# Patient Record
Sex: MALE | Race: WHITE | NOT HISPANIC OR LATINO | Employment: FULL TIME | URBAN - METROPOLITAN AREA
[De-identification: names, ages, dates, MRNs, and addresses within clinical notes are randomized per-mention and may not be internally consistent; named-entity substitution may affect disease eponyms.]

---

## 2017-09-13 ENCOUNTER — HOSPITAL ENCOUNTER (EMERGENCY)
Facility: HOSPITAL | Age: 42
Discharge: HOME | End: 2017-09-14
Attending: EMERGENCY MEDICINE | Admitting: EMERGENCY MEDICINE

## 2017-09-13 DIAGNOSIS — K70.9 ALCOHOLIC LIVER DISEASE (HCC): Primary | ICD-10-CM

## 2017-09-13 DIAGNOSIS — R60.0 PEDAL EDEMA: ICD-10-CM

## 2017-09-13 PROCEDURE — 85007 BL SMEAR W/DIFF WBC COUNT: CPT | Performed by: PHYSICIAN ASSISTANT

## 2017-09-13 PROCEDURE — 85025 COMPLETE CBC W/AUTO DIFF WBC: CPT | Performed by: PHYSICIAN ASSISTANT

## 2017-09-13 PROCEDURE — 99284 EMERGENCY DEPT VISIT MOD MDM: CPT

## 2017-09-13 PROCEDURE — 81001 URINALYSIS AUTO W/SCOPE: CPT | Performed by: PHYSICIAN ASSISTANT

## 2017-09-13 PROCEDURE — 80053 COMPREHEN METABOLIC PANEL: CPT | Performed by: PHYSICIAN ASSISTANT

## 2017-09-13 RX ORDER — PREDNISONE 5 MG/ML
SOLUTION ORAL DAILY
COMMUNITY

## 2017-09-14 VITALS
SYSTOLIC BLOOD PRESSURE: 150 MMHG | BODY MASS INDEX: 52.48 KG/M2 | RESPIRATION RATE: 18 BRPM | WEIGHT: 315 LBS | DIASTOLIC BLOOD PRESSURE: 80 MMHG | OXYGEN SATURATION: 98 % | HEIGHT: 65 IN | HEART RATE: 90 BPM | TEMPERATURE: 98.9 F

## 2017-09-14 LAB
ALBUMIN SERPL-MCNC: 2.9 G/DL (ref 3.5–5.2)
ALBUMIN/GLOB SERPL: 0.9 G/DL
ALP SERPL-CCNC: 179 U/L (ref 39–117)
ALT SERPL W P-5'-P-CCNC: 153 U/L (ref 1–41)
ANION GAP SERPL CALCULATED.3IONS-SCNC: 12.2 MMOL/L
ANISOCYTOSIS BLD QL: NORMAL
AST SERPL-CCNC: 194 U/L (ref 1–40)
BACTERIA UR QL AUTO: ABNORMAL /HPF
BASOPHILS # BLD AUTO: 0 10*3/MM3 (ref 0–0.2)
BASOPHILS NFR BLD AUTO: 0 % (ref 0–1.5)
BILIRUB SERPL-MCNC: 9.3 MG/DL (ref 0.1–1.2)
BILIRUB UR QL STRIP: ABNORMAL
BUN BLD-MCNC: 20 MG/DL (ref 6–20)
BUN/CREAT SERPL: 16.8 (ref 7–25)
CALCIUM SPEC-SCNC: 8.5 MG/DL (ref 8.6–10.5)
CHLORIDE SERPL-SCNC: 98 MMOL/L (ref 98–107)
CLARITY UR: CLEAR
CO2 SERPL-SCNC: 24.8 MMOL/L (ref 22–29)
COLOR UR: ABNORMAL
CREAT BLD-MCNC: 1.19 MG/DL (ref 0.76–1.27)
DEPRECATED RDW RBC AUTO: 76.6 FL (ref 37–54)
EOSINOPHIL # BLD AUTO: 0.04 10*3/MM3 (ref 0–0.7)
EOSINOPHIL NFR BLD AUTO: 0.2 % (ref 0.3–6.2)
ERYTHROCYTE [DISTWIDTH] IN BLOOD BY AUTOMATED COUNT: 19.5 % (ref 11.5–14.5)
GFR SERPL CREATININE-BSD FRML MDRD: 68 ML/MIN/1.73
GLOBULIN UR ELPH-MCNC: 3.4 GM/DL
GLUCOSE BLD-MCNC: 96 MG/DL (ref 65–99)
GLUCOSE UR STRIP-MCNC: NEGATIVE MG/DL
HCT VFR BLD AUTO: 29.3 % (ref 40.4–52.2)
HGB BLD-MCNC: 10.5 G/DL (ref 13.7–17.6)
HGB UR QL STRIP.AUTO: NEGATIVE
HYALINE CASTS UR QL AUTO: ABNORMAL /LPF
IMM GRANULOCYTES # BLD: 0.12 10*3/MM3 (ref 0–0.03)
IMM GRANULOCYTES NFR BLD: 0.7 % (ref 0–0.5)
KETONES UR QL STRIP: NEGATIVE
LEUKOCYTE ESTERASE UR QL STRIP.AUTO: NEGATIVE
LYMPHOCYTES # BLD AUTO: 0.8 10*3/MM3 (ref 0.9–4.8)
LYMPHOCYTES NFR BLD AUTO: 4.6 % (ref 19.6–45.3)
MACROCYTES BLD QL SMEAR: NORMAL
MCH RBC QN AUTO: 39.3 PG (ref 27–32.7)
MCHC RBC AUTO-ENTMCNC: 35.8 G/DL (ref 32.6–36.4)
MCV RBC AUTO: 109.7 FL (ref 79.8–96.2)
MONOCYTES # BLD AUTO: 1.38 10*3/MM3 (ref 0.2–1.2)
MONOCYTES NFR BLD AUTO: 7.9 % (ref 5–12)
MUCOUS THREADS URNS QL MICRO: ABNORMAL /HPF
NEUTROPHILS # BLD AUTO: 15.12 10*3/MM3 (ref 1.9–8.1)
NEUTROPHILS NFR BLD AUTO: 86.6 % (ref 42.7–76)
NITRITE UR QL STRIP: NEGATIVE
NRBC BLD MANUAL-RTO: 0 /100 WBC (ref 0–0)
PH UR STRIP.AUTO: 5.5 [PH] (ref 5–8)
PLAT MORPH BLD: NORMAL
PLATELET # BLD AUTO: 170 10*3/MM3 (ref 140–500)
PMV BLD AUTO: 11.2 FL (ref 6–12)
POTASSIUM BLD-SCNC: 4 MMOL/L (ref 3.5–5.2)
PROT SERPL-MCNC: 6.3 G/DL (ref 6–8.5)
PROT UR QL STRIP: ABNORMAL
RBC # BLD AUTO: 2.67 10*6/MM3 (ref 4.6–6)
RBC # UR: ABNORMAL /HPF
REF LAB TEST METHOD: ABNORMAL
SODIUM BLD-SCNC: 135 MMOL/L (ref 136–145)
SP GR UR STRIP: >=1.03 (ref 1–1.03)
SQUAMOUS #/AREA URNS HPF: ABNORMAL /HPF
TARGETS BLD QL SMEAR: NORMAL
UROBILINOGEN UR QL STRIP: ABNORMAL
WBC MORPH BLD: NORMAL
WBC NRBC COR # BLD: 17.46 10*3/MM3 (ref 4.5–10.7)
WBC UR QL AUTO: ABNORMAL /HPF

## 2017-09-14 NOTE — ED NOTES
Ace bandages applied to pts bilateral lower legs per ERT, per MD Tompkins order. Pt tolerated well.      Huyen Rubalcava RN  09/14/17 0049

## 2017-09-14 NOTE — ED PROVIDER NOTES
EMERGENCY DEPARTMENT ENCOUNTER    CHIEF COMPLAINT  Chief Complaint: leg swelling  History given by: patient  History limited by: nothing  Room Number: 30/30  PMD: Gus Yang MD      HPI:  Pt is a 42 y.o. male who presents complaining of BLE edema x5 days. Pt states he was admitted to a hospital in [place] on 9/3/17 for 5 days for EtOH detox and was taken off all of his BP medications at that time due to hypotension. Pt states he has not taken any of his BP medications since discharge and believes his leg are swelling because he is not taking his diuretic. Pt denies shortness of breath, chest pain and has no other complaints at this time.    Onset: gradual  Timing: constant  Location: BLE  Quality: swelling  Intensity/Severity: moderate  Progression: unchanged  Associated Symptoms: none  Aggravating Factors: none  Alleviating Factors: none  Previous Episodes: none  Treatment before arrival: none    PAST MEDICAL HISTORY  Active Ambulatory Problems     Diagnosis Date Noted   • No Active Ambulatory Problems     Resolved Ambulatory Problems     Diagnosis Date Noted   • No Resolved Ambulatory Problems     Past Medical History:   Diagnosis Date   • Disease of thyroid gland    • Enlarged liver    • Hypertension        PAST SURGICAL HISTORY  History reviewed. No pertinent surgical history.    FAMILY HISTORY  History reviewed. No pertinent family history.    SOCIAL HISTORY  Social History     Social History   • Marital status:      Spouse name: N/A   • Number of children: N/A   • Years of education: N/A     Occupational History   • Not on file.     Social History Main Topics   • Smoking status: Current Every Day Smoker     Packs/day: 0.50   • Smokeless tobacco: Not on file   • Alcohol use Not on file      Comment: sober [facility] 9/3   • Drug use: Yes     Special: Marijuana   • Sexual activity: Not on file     Other Topics Concern   • Not on file     Social History Narrative   • No narrative on file        ALLERGIES  Review of patient's allergies indicates no known allergies.    REVIEW OF SYSTEMS  Review of Systems   Constitutional: Negative for chills and fever.   HENT: Negative for sore throat and trouble swallowing.    Eyes: Negative for visual disturbance.   Respiratory: Negative for cough and shortness of breath.    Cardiovascular: Positive for leg swelling (bilateral). Negative for chest pain.   Gastrointestinal: Negative for abdominal pain, diarrhea and vomiting.   Endocrine: Negative.    Genitourinary: Negative for decreased urine volume and frequency.   Musculoskeletal: Negative for neck pain.   Skin: Negative for rash.   Allergic/Immunologic: Negative.    Neurological: Negative for weakness and numbness.   Hematological: Negative.    Psychiatric/Behavioral: Negative.    All other systems reviewed and are negative.      PHYSICAL EXAM  ED Triage Vitals   Temp Heart Rate Resp BP SpO2   09/13/17 2128 09/13/17 2128 09/13/17 2128 09/13/17 2128 09/13/17 2128   98.9 °F (37.2 °C) 101 18 126/55 95 %      Temp src Heart Rate Source Patient Position BP Location FiO2 (%)   09/13/17 2128 09/13/17 2128 -- 09/13/17 2128 --   Oral Monitor  Right arm        Physical Exam   Constitutional: He is oriented to person, place, and time and well-developed, well-nourished, and in no distress.   Severely obese   HENT:   Head: Normocephalic and atraumatic.   Eyes: EOM are normal. Pupils are equal, round, and reactive to light.   Neck: Normal range of motion. Neck supple.   Cardiovascular: Normal rate, regular rhythm and normal heart sounds.    Pulmonary/Chest: Effort normal and breath sounds normal. No respiratory distress.   Abdominal: Soft. There is no tenderness. There is no rebound and no guarding.   Musculoskeletal: Normal range of motion. He exhibits edema (2+, extending to mid thigh).   Neurological: He is alert and oriented to person, place, and time. He has normal sensation and normal strength.   Skin: Skin is warm and  dry.   Psychiatric: Mood and affect normal.   Nursing note and vitals reviewed.      LAB RESULTS  Lab Results (last 24 hours)     Procedure Component Value Units Date/Time    CBC & Differential [[id number]] Collected:  09/13/17 2336    Specimen:  Blood Updated:  09/14/17 0001    Narrative:       The following orders were created for panel order CBC & Differential.  Procedure                               Abnormality         Status                     ---------                               -----------         ------                     Scan Slide[[id number]]                                       Final result               CBC Auto Differential[[id number]]        Abnormal            Final result                 Please view results for these tests on the individual orders.    Comprehensive Metabolic Panel [[id number]]  (Abnormal) Collected:  09/13/17 2336    Specimen:  Blood Updated:  09/14/17 0011     Glucose 96 mg/dL      BUN 20 mg/dL      Creatinine 1.19 mg/dL      Sodium 135 (L) mmol/L      Potassium 4.0 mmol/L      Chloride 98 mmol/L      CO2 24.8 mmol/L      Calcium 8.5 (L) mg/dL      Total Protein 6.3 g/dL      Albumin 2.90 (L) g/dL      ALT (SGPT) 153 (H) U/L      AST (SGOT) 194 (H) U/L      Alkaline Phosphatase 179 (H) U/L      Total Bilirubin 9.3 (H) mg/dL      eGFR Non African Amer 68 mL/min/1.73      Globulin 3.4 gm/dL      A/G Ratio 0.9 g/dL      BUN/Creatinine Ratio 16.8     Anion Gap 12.2 mmol/L     Urinalysis With / Culture If Indicated [[id number]]  (Abnormal) Collected:  09/13/17 2336    Specimen:  Urine from Urine, Clean Catch Updated:  09/14/17 0003     Color, UA Dark Yellow (A)     Appearance, UA Clear     pH, UA 5.5     Specific Gravity, UA >=1.030     Glucose, UA Negative     Ketones, UA Negative     Bilirubin, UA Large (3+) (A)     Blood, UA Negative     Protein, UA 30 mg/dL (1+) (A)     Leuk Esterase, UA Negative     Nitrite, UA Negative     Urobilinogen, UA 1.0 E.U./dL    CBC Auto Differential  [[id number]]  (Abnormal) Collected:  09/13/17 2336    Specimen:  Blood Updated:  09/14/17 0001     WBC 17.46 (H) 10*3/mm3      RBC 2.67 (L) 10*6/mm3      Hemoglobin 10.5 (L) g/dL      Hematocrit 29.3 (L) %      .7 (H) fL      MCH 39.3 (H) pg      MCHC 35.8 g/dL      RDW 19.5 (H) %      RDW-SD 76.6 (H) fl      MPV 11.2 fL      Platelets 170 10*3/mm3      Neutrophil % 86.6 (H) %      Lymphocyte % 4.6 (L) %      Monocyte % 7.9 %      Eosinophil % 0.2 (L) %      Basophil % 0.0 %      Immature Grans % 0.7 (H) %      Neutrophils, Absolute 15.12 (H) 10*3/mm3      Lymphocytes, Absolute 0.80 (L) 10*3/mm3      Monocytes, Absolute 1.38 (H) 10*3/mm3      Eosinophils, Absolute 0.04 10*3/mm3      Basophils, Absolute 0.00 10*3/mm3      Immature Grans, Absolute 0.12 (H) 10*3/mm3      nRBC 0.0 /100 WBC     Urinalysis, Microscopic Only [[id number]]  (Abnormal) Collected:  09/13/17 2336    Specimen:  Urine from Urine, Clean Catch Updated:  09/14/17 0003     RBC, UA 0-2 /HPF      WBC, UA 0-2 /HPF      Bacteria, UA None Seen /HPF      Squamous Epithelial Cells, UA 0-2 /HPF      Hyaline Casts, UA 3-6 /LPF      Mucus, UA Moderate/2+ (A) /HPF      Methodology Manual Light Microscopy    Scan Slide [[id number]] Collected:  09/13/17 2336    Specimen:  Blood Updated:  09/14/17 0001     Anisocytosis Mod/2+     Macrocytes Mod/2+     Target Cells Large/3+     WBC Morphology Normal     Platelet Morphology Normal          I ordered the above labs and reviewed the results      PROCEDURES  Procedures      PROGRESS AND CONSULTS  ED Course   Comment By Time   Patient may be experiencing some nephrotic syndrome.  He does have normal renal function but protein in the urine and low albumin.  There is no sign of CHF, and he does have alcohol liver disease with elevated enzymes.  I suggested that he follow up with her primary care provider, and will likely need a renal and GI follow-up is if symptoms are unimproved.  In the meantime I think you benefit  from compression stockings, leg elevation, alcohol avoidance, and low-sodium diet.  If symptoms aren't improving in a few days, he may require diuretic therapy. Vishal Tompkins MD 09/14 0042     11:13 PM  Discussed with Pt that his BLE edema is most likely not related to stopping his diuretic since his swelling is only in the lower extremities. Advised Pt to wear compression stockings, elevate legs when sitting, and avoids foods with high sodium. Plan to check kidney function prior to discharge.     12:16 AM  Pt rechecked and is resting comfortably. BP- 153/85 HR- 91 Temp- 98.9 °F (37.2 °C) (Oral) O2 sat- 98%. All pertinent lab/imaging/EKG findings discussed including no evidence of CHF or renal failure on labs. Discussed that labs do show evidence of alcoholic liver disease and low protein with is likely the etiology of Pt's sx. Pt/family verbalized understanding and agree with plan to discharge for follow up with GI. All questions and concerns addressed at this time.       MEDICAL DECISION MAKING  Results were reviewed/discussed with the patient and they were also made aware of online access. Pt also made aware that some labs, such as cultures, will not be resulted during ER visit and follow up with PMD is necessary.     MDM  Number of Diagnoses or Management Options     Amount and/or Complexity of Data Reviewed  Clinical lab tests: ordered and reviewed (Creatinine: 1.19, ALT: 153, AST, 194, Alb: 2.90)    Patient Progress  Patient progress: stable         DIAGNOSIS  Final diagnoses:   Alcoholic liver disease   Pedal edema       DISPOSITION  Disposition: Discharged.    Patient discharged in stable condition.    Reviewed implications of results, diagnosis, meds, responsibility to follow up, warning signs and symptoms of possible worsening, potential complications and reasons to return to ER.    Patient/Family voiced understanding of above instructions.    Discussed plan for discharge, as there is no emergent  indication for admission.  Pt/family is agreeable and understands need for follow up and repeat testing.  Pt is aware that discharge does not mean that nothing is wrong but it indicates no emergency is present and they must continue care with follow-up as given below or physician of their choice.     FOLLOW-UP  Gus Yang MD  [address] [facility] [address]  [phone]               Medication List      Stop          LEVOTHYROXINE SODIUM PO       predniSONE 5 MG/5ML solution             Latest Documented Vital Signs:  As of 12:19 AM  BP- 153/85 HR- 91 Temp- 98.9 °F (37.2 °C) (Oral) O2 sat- 98%    --  Documentation assistance provided by harry Guzman for Dr. Tompkins.  Information recorded by the scribe was done at my direction and has been verified and validated by me.         Melonie Guzman  09/14/17 0019       Vishal Tompkins MD  09/14/17 0042

## 2017-09-14 NOTE — ED NOTES
Bilateral legs neurovascularly intact. Reassurance given; call light in reach. Pts breathing even and unlabored. Pt appears in NAD at this time. Family at bedside.        Huyen Rubalcava RN  09/14/17 0030

## 2017-09-14 NOTE — ED TRIAGE NOTES
Pt attempted to detox off of alcohol. Pt was taken off of bp meds with detox and at the [facility]. Pt reports increase in peripheral edema